# Patient Record
(demographics unavailable — no encounter records)

---

## 2024-12-19 NOTE — HEALTH RISK ASSESSMENT
[Very Good] : ~his/her~  mood as very good [No] : No [Monthly or less (1 pt)] : Monthly or less (1 point) [1 or 2 (0 pts)] : 1 or 2 (0 points) [Never (0 pts)] : Never (0 points) [No falls in past year] : Patient reported no falls in the past year [0] : 2) Feeling down, depressed, or hopeless: Not at all (0) [de-identified] : i do [de-identified] : ok [Never] : Never [NO] : No [Patient reported mammogram was normal] : Patient reported mammogram was normal [Patient declined PAP Smear] : Patient declined PAP Smear [Patient reported bone density results were normal] : Patient reported bone density results were normal [Patient reported colonoscopy was normal] : Patient reported colonoscopy was normal [HIV test declined] : HIV test declined [Hepatitis C test declined] : Hepatitis C test declined [Change in mental status noted] : No change in mental status noted [Language] : denies difficulty with language [Behavior] : denies difficulty with behavior [Learning/Retaining New Information] : denies difficulty learning/retaining new information [Handling Complex Tasks] : denies difficulty handling complex tasks [Reasoning] : denies difficulty with reasoning [Spatial Ability and Orientation] : denies difficulty with spatial ability and orientation [None] : None [With Significant Other] : lives with significant other [Retired] : retired [] :  [# Of Children ___] : has [unfilled] children [Sexually Active] : not sexually active [Feels Safe at Home] : Feels safe at home [Fully functional (bathing, dressing, toileting, transferring, walking, feeding)] : Fully functional (bathing, dressing, toileting, transferring, walking, feeding) [Fully functional (using the telephone, shopping, preparing meals, housekeeping, doing laundry, using] : Fully functional and needs no help or supervision to perform IADLs (using the telephone, shopping, preparing meals, housekeeping, doing laundry, using transportation, managing medications and managing finances) [Reports changes in hearing] : Reports no changes in hearing [Reports changes in vision] : Reports no changes in vision [Reports changes in dental health] : Reports no changes in dental health [MammogramDate] : 2024 [BoneDensityDate] : 1/2024 [ColonoscopyDate] : 2018

## 2025-05-15 NOTE — HISTORY OF PRESENT ILLNESS
[FreeTextEntry1] : epigastric pain [de-identified] : pressure pain epigastric  radiates to back worse at right

## 2025-06-05 NOTE — HISTORY OF PRESENT ILLNESS
[FreeTextEntry1] : pancreatic lesion [de-identified] : pt w/ recurrent epigastric pain and US and MRCP as below needs EUS

## 2025-06-18 NOTE — HISTORY OF PRESENT ILLNESS
[Disease: _____________________] : Disease: [unfilled] [de-identified] : 73 years old female with PMHX of High Cholesterol, GERD, Seasonal Allergies, Asthma presents for initial evaluation of newly diagnosed Pancreatic Cancer. As per patient, her symptoms began 1 month ago with mid upper abdominal pain. The pain was on and off 10/10 which radiates towards her back. She had increased fatigue, decreased appetite and weight loss of ~18 pounds. She was seen by her PCP who ordered an abdominal ultrasound that revealed pancreatic mass. A MRCP was ordered with a biopsy that was positive for adenocarcinoma. She denies any itchy skin, yellowing eyes/skin, tea-colored urine, or pale stools.  The patient is  and lives with her . She has 3 well adult children. The patent is former smoker who stopped smoking 40 years ago. Prior, she smoked 1/2 PPD X 12 years. She drinks 1 to 2 glasses of wine daily x 20 years. The patient is a retired  for [employer]. She has a positive family history of cancer. Her mother had Breast Cancer at 43 years old.   05/22/2025, US Abdomen: No evidence of cholelithiasis. Apparent pancreatic mass. Further evaluation with CT or MRI/MRCP is recommended.  05/27/2025, MRI, MRCP: 4.1 x 2.3 x 0.8 cm lesion in the body of the pancreas which correlates with the lesion seen on the ultrasound of the abdomen on 5/19/2025. Differentials include pancreatic carcinoma such as adenocarcinoma or acinar cell carcinoma. Recommend EUS/biopsy for further evaluation.  06/09/2025, Surgical Pathology Report: 1.  Gastric, random biopsy-   Mild Chronic inactive gastritis and mild reactive gastropathy change. Negative for intestinal metaplasia. Negative for morphologic evidence of H. pylori. 2.  Pancreatic body, mass, biopsy - Adenocarcinoma.  06/12/2025, CT-Scan of Abdomen/Pelvis/Chest: Pancreatic body hypo enhancing mass with distal duct dilatation and atrophy, suspicious for adenocarcinoma. No evidence of vascular involvement or metastatic disease.  [de-identified] :  Adenocarcinoma

## 2025-06-18 NOTE — ASSESSMENT
[Curative] : Goals of care discussed with patient: Curative [FreeTextEntry1] : A discussion took place with the patient and her  regarding further management.  In summary the patient presented  1 month ago with initial abdominal and low back pain associated with nausea and 18 pound weight loss along with metallic taste.  The pain intensity was 10/10.  The patient underwent ultrasound and MRCP in May 2025 and was found to have a 2.8 cm mass in the body of the pancreas.  Endoscopic ultrasound confirmed a 2.7 cm mass in the body of the pancreas and biopsy was positive for adenocarcinoma.  The tumor markers are in the normal range.  Of note is a family history of breast cancer in her mother at age 42.    The review of her imaging suggests that the tumor is close to a vein and therefore the patient would be eligible for neoadjuvant treatment.  Since she has a good performance status we discussed initial treatment with FOLFIRINOX including oxaliplatin, 5-FU, leucovorin and irinotecan.  The side effects and toxicity were reviewed in detail and informed consent was obtained.  The patient is also being evaluated for protocol therapy with a taxane impregnated mesh placed on the pancreas followed by chemotherapy and that attempt to increase delivery of chemotherapy to the tumor.  The patient will be evaluated for Mediport placement, screening for hepatitis and also for genes that may increase toxicity of the chemotherapy.  We also spoke about nutrition consultation to maintain adequate caloric intake and hopefully avoid further weight loss.  He also would be a candidate for psychosocial consultation for psychologic support during her treatments.  We also mention salvage treatment with gemcitabine and Abraxane which could be used should the FOLFIRINOX regimen not be effective in controlling her disease.  Once we have more information further recommendations will be made.

## 2025-06-18 NOTE — HISTORY OF PRESENT ILLNESS
[Disease: _____________________] : Disease: [unfilled] [de-identified] : 73 years old female with PMHX of High Cholesterol, GERD, Seasonal Allergies, Asthma presents for initial evaluation of newly diagnosed Pancreatic Cancer. As per patient, her symptoms began 1 month ago with mid upper abdominal pain. The pain was on and off 10/10 which radiates towards her back. She had increased fatigue, decreased appetite and weight loss of ~18 pounds. She was seen by her PCP who ordered an abdominal ultrasound that revealed pancreatic mass. A MRCP was ordered with a biopsy that was positive for adenocarcinoma. She denies any itchy skin, yellowing eyes/skin, tea-colored urine, or pale stools.  The patient is  and lives with her . She has 3 well adult children. The patent is former smoker who stopped smoking 40 years ago. Prior, she smoked 1/2 PPD X 12 years. She drinks 1 to 2 glasses of wine daily x 20 years. The patient is a retired  for [employer]. She has a positive family history of cancer. Her mother had Breast Cancer at 43 years old.   05/22/2025, US Abdomen: No evidence of cholelithiasis. Apparent pancreatic mass. Further evaluation with CT or MRI/MRCP is recommended.  05/27/2025, MRI, MRCP: 4.1 x 2.3 x 0.8 cm lesion in the body of the pancreas which correlates with the lesion seen on the ultrasound of the abdomen on 5/19/2025. Differentials include pancreatic carcinoma such as adenocarcinoma or acinar cell carcinoma. Recommend EUS/biopsy for further evaluation.  06/09/2025, Surgical Pathology Report: 1.  Gastric, random biopsy-   Mild Chronic inactive gastritis and mild reactive gastropathy change. Negative for intestinal metaplasia. Negative for morphologic evidence of H. pylori. 2.  Pancreatic body, mass, biopsy - Adenocarcinoma.  06/12/2025, CT-Scan of Abdomen/Pelvis/Chest: Pancreatic body hypo enhancing mass with distal duct dilatation and atrophy, suspicious for adenocarcinoma. No evidence of vascular involvement or metastatic disease.  [de-identified] :  Adenocarcinoma

## 2025-06-20 NOTE — ASSESSMENT
[FreeTextEntry1] : 74yo F with 3.1 cm pancreatic body mass.  Abuts confluence portal vein/ splenic.  Borderline resectable.  Discussed preference for upfront systemic therapy.   First identified on an abd u/s 5/2025 which was obtained for GI complaints. MRCP 5/27/2025 showed a 4.1 x 2.3 x 0.8 cm pancreatic body lesion. EUS 6/9/2025 w Dr. Ambrosio confirmed a 2.7 x 2.6cm pancreatic body mass which was biopsied. PD measured 2 mm. No significant abnormality seen in HOP, genu of pancreas, panc tail or main PD, gallbladder, CBD, or left hepatic lobe.  Pathology w adenocarcinoma.  CA 19-9 non-secretor.   CT CAP Panc Protocol 6/12/2025 Pancreatic body hypoenhancing mass with distal duct dilatation and atrophy, suspicious for adenocarcinoma.   PANTHER trial eligibility: Patient's tumor size and anatomy is amenable to PTM-101 placement based on imaging Patient has no signs or symptoms of pancreatitis Patient does not have other active medical issues which would confound interpretation of safety monitoring, efficacy results or prevent the subject from study participation Patient is not of child-bearing potential Patient does not have active non-pancreatic cancer that currently requires treatment or is being treated : non-melanoma skin cancer in 2024 is not exclusionary Patient does not have any contraindications or allergies to paclitaxel, PLGA, or chemotherapies in protocol (e.g., FOLFIRINOX, gemcitabine, nab-paclitaxel) Patient does not have known HIV or active viral hepatitis Patient does not have active ongoing infection or autoimmune disease which may preclude laparoscopy, placement of the PTM-101 implant, administration of chemotherapy or surgical resection of pancreatic tumor Patient does not have the inability to comply with activities and therapeutic interventions as outlined in the schedule of events Patient is not currently enrolled in another investigational drug or device trial Patient does not have any other medical or surgical conditions, and prior abdominal surgery, that would preclude safe laparoscopy or implantation  Plan: Upfront systemic therapy Discuss w clinical trial team eligibility for PANTHER Trial  If plan for clinical trial will plan for mediport placement at same time Genetics, dietician, social work evals

## 2025-06-20 NOTE — HISTORY OF PRESENT ILLNESS
[de-identified] : 74yo F who underwent abdominal ultrasound for c/o abd pain, nausea and 18lb unintentional weight loss x 2-3 months.   Hemoglobin was slightly elevated 6.2.   Ultrasound on 5/19/2025 showed no cholelithiasis but did note a 2.8 cm pancreatic neck/body mass.   MRCP 5/27/2025 showed a 4.1 x 2.3 x 0.8 cm pancreatic body lesion.    EUS 6/9/2025 w Dr. Ambrosio confirmed a 2.7 x 2.6 cm pancreatic body mass which was biopsied. PD measured 2 mm. No significant abnormality seen in HOP, genu of pancreas, panc tail or main PD, gallbladder, CBD, or left hepatic lobe.   Pathology with adenocarcinoma.   Ca19-9 < 2 & CEA 2.9 on 6/10/2025.   CT CAP Panc Protocol 6/12/2025 Pancreatic body hypoenhancing mass with distal duct dilatation and atrophy, suspicious for adenocarcinoma. No evidence of vascular involvement or metastatic disease.  Patient was presented at [facility] and consensus was for neoadj therapy.   6/17/2025 136lb ---> 118lb Continues with abdominal pain. Impacting sleep. Taking Excedrin and Tylenol q 4-6 hours. Helps momentarily then returns.  + nausea but no vomiting.  + fatigue. Mild anorexia & early satiety.    PMHx: HDL, Asthma, GERD  PSHx: None  FHx: Mother w breast ca (age 43yo), no genetic testing.   Allergies: Amoxicillin   Meds: Omeprazole AM, Pantoprazole HS, Montelukast, Crestor   Social: Lives w . Non-smoker, enjoys alcohol, but has stopped since diagnosis. 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

## 2025-06-20 NOTE — HISTORY OF PRESENT ILLNESS
[de-identified] : 74yo F who underwent abdominal ultrasound for c/o abd pain, nausea and 18lb unintentional weight loss x 2-3 months.   Hemoglobin was slightly elevated 6.2.   Ultrasound on 5/19/2025 showed no cholelithiasis but did note a 2.8 cm pancreatic neck/body mass.   MRCP 5/27/2025 showed a 4.1 x 2.3 x 0.8 cm pancreatic body lesion.    EUS 6/9/2025 w Dr. Ambrosio confirmed a 2.7 x 2.6 cm pancreatic body mass which was biopsied. PD measured 2 mm. No significant abnormality seen in HOP, genu of pancreas, panc tail or main PD, gallbladder, CBD, or left hepatic lobe.   Pathology with adenocarcinoma.   Ca19-9 < 2 & CEA 2.9 on 6/10/2025.   CT CAP Panc Protocol 6/12/2025 Pancreatic body hypoenhancing mass with distal duct dilatation and atrophy, suspicious for adenocarcinoma. No evidence of vascular involvement or metastatic disease.  Patient was presented at [facility] and consensus was for neoadj therapy.   6/17/2025 136lb ---> 118lb Continues with abdominal pain. Impacting sleep. Taking Excedrin and Tylenol q 4-6 hours. Helps momentarily then returns.  + nausea but no vomiting.  + fatigue. Mild anorexia & early satiety.    PMHx: HDL, Asthma, GERD  PSHx: None  FHx: Mother w breast ca (age 41yo), no genetic testing.   Allergies: Amoxicillin   Meds: Omeprazole AM, Pantoprazole HS, Montelukast, Crestor   Social: Lives w . Non-smoker, enjoys alcohol, but has stopped since diagnosis. 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

## 2025-06-20 NOTE — PHYSICAL EXAM
[FreeTextEntry1] : General: No acute distress. Well nourished. Head: AT/NC Eyes: PERRL. EOMI. Pulmonary: No respiratory distress.  ABD: Soft. NT/ND. No rebound, no guarding, no rigidity. No peritoneal signs. No masses. Extremities: Warm. No edema. Neurological: A&O x 4. Normal strength. Psychiatric: Normal affect and mood.

## 2025-06-20 NOTE — HISTORY OF PRESENT ILLNESS
[de-identified] : 72yo F who underwent abdominal ultrasound for c/o abd pain, nausea and 18lb unintentional weight loss x 2-3 months.   Hemoglobin was slightly elevated 6.2.   Ultrasound on 5/19/2025 showed no cholelithiasis but did note a 2.8 cm pancreatic neck/body mass.   MRCP 5/27/2025 showed a 4.1 x 2.3 x 0.8 cm pancreatic body lesion.    EUS 6/9/2025 w Dr. Ambrosio confirmed a 2.7 x 2.6 cm pancreatic body mass which was biopsied. PD measured 2 mm. No significant abnormality seen in HOP, genu of pancreas, panc tail or main PD, gallbladder, CBD, or left hepatic lobe.   Pathology with adenocarcinoma.   Ca19-9 < 2 & CEA 2.9 on 6/10/2025.   CT CAP Panc Protocol 6/12/2025 Pancreatic body hypoenhancing mass with distal duct dilatation and atrophy, suspicious for adenocarcinoma. No evidence of vascular involvement or metastatic disease.  Patient was presented at [facility] and consensus was for neoadj therapy.   6/17/2025 136lb ---> 118lb Continues with abdominal pain. Impacting sleep. Taking Excedrin and Tylenol q 4-6 hours. Helps momentarily then returns.  + nausea but no vomiting.  + fatigue. Mild anorexia & early satiety.    PMHx: HDL, Asthma, GERD  PSHx: None  FHx: Mother w breast ca (age 41yo), no genetic testing.   Allergies: Amoxicillin   Meds: Omeprazole AM, Pantoprazole HS, Montelukast, Crestor   Social: Lives w . Non-smoker, enjoys alcohol, but has stopped since diagnosis. 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

## 2025-06-20 NOTE — ASSESSMENT
[FreeTextEntry1] : 72yo F with 3.1 cm pancreatic body mass.  Abuts confluence portal vein/ splenic.  Borderline resectable.  Discussed preference for upfront systemic therapy.   First identified on an abd u/s 5/2025 which was obtained for GI complaints. MRCP 5/27/2025 showed a 4.1 x 2.3 x 0.8 cm pancreatic body lesion. EUS 6/9/2025 w Dr. Ambrosio confirmed a 2.7 x 2.6cm pancreatic body mass which was biopsied. PD measured 2 mm. No significant abnormality seen in HOP, genu of pancreas, panc tail or main PD, gallbladder, CBD, or left hepatic lobe.  Pathology w adenocarcinoma.  CA 19-9 non-secretor.   CT CAP Panc Protocol 6/12/2025 Pancreatic body hypoenhancing mass with distal duct dilatation and atrophy, suspicious for adenocarcinoma.   PANTHER trial eligibility: Patient's tumor size and anatomy is amenable to PTM-101 placement based on imaging Patient has no signs or symptoms of pancreatitis Patient does not have other active medical issues which would confound interpretation of safety monitoring, efficacy results or prevent the subject from study participation Patient is not of child-bearing potential Patient does not have active non-pancreatic cancer that currently requires treatment or is being treated : non-melanoma skin cancer in 2024 is not exclusionary Patient does not have any contraindications or allergies to paclitaxel, PLGA, or chemotherapies in protocol (e.g., FOLFIRINOX, gemcitabine, nab-paclitaxel) Patient does not have known HIV or active viral hepatitis Patient does not have active ongoing infection or autoimmune disease which may preclude laparoscopy, placement of the PTM-101 implant, administration of chemotherapy or surgical resection of pancreatic tumor Patient does not have the inability to comply with activities and therapeutic interventions as outlined in the schedule of events Patient is not currently enrolled in another investigational drug or device trial Patient does not have any other medical or surgical conditions, and prior abdominal surgery, that would preclude safe laparoscopy or implantation  Plan: Upfront systemic therapy Discuss w clinical trial team eligibility for PANTHER Trial  If plan for clinical trial will plan for mediport placement at same time Genetics, dietician, social work evals

## 2025-06-24 NOTE — HISTORY OF PRESENT ILLNESS
[No Pertinent Cardiac History] : no history of aortic stenosis, atrial fibrillation, coronary artery disease, recent myocardial infarction, or implantable device/pacemaker [No Pertinent Pulmonary History] : no history of asthma, COPD, sleep apnea, or smoking [No Adverse Anesthesia Reaction] : no adverse anesthesia reaction in self or family member [(Patient denies any chest pain, claudication, dyspnea on exertion, orthopnea, palpitations or syncope)] : Patient denies any chest pain, claudication, dyspnea on exertion, orthopnea, palpitations or syncope [Good (7-10 METs)] : Good (7-10 METs) [Aortic Stenosis] : no aortic stenosis [Atrial Fibrillation] : no atrial fibrillation [Coronary Artery Disease] : no coronary artery disease [Recent Myocardial Infarction] : no recent myocardial infarction [Implantable Device/Pacemaker] : no implantable device/pacemaker [Asthma] : no asthma [COPD] : no COPD [Sleep Apnea] : no sleep apnea [Smoker] : not a smoker [Family Member] : no family member with adverse anesthesia reaction/sudden death [Self] : no previous adverse anesthesia reaction [Chronic Anticoagulation] : no chronic anticoagulation [Chronic Kidney Disease] : no chronic kidney disease [Diabetes] : no diabetes [FreeTextEntry1] : Laparoscopic pancreatic surgery / SOO trial [FreeTextEntry2] : 6/26/2025 [FreeTextEntry3] : Dr Parish

## 2025-06-24 NOTE — ASSESSMENT
[High Risk Surgery - Intraperitoneal, Intrathoracic or Supringuinal Vascular Procedures] : High Risk Surgery - Intraperitoneal, Intrathoracic or Supringuinal Vascular Procedures - No (0) [Ischemic Heart Disease] : Ischemic Heart Disease - No (0) [Congestive Heart Failure] : Congestive Heart Failure - No (0) [Prior Cerebrovascular Accident or TIA] : Prior Cerebrovascular Accident or TIA - No (0) [Creatinine >= 2mg/dL (1 Point)] : Creatinine >= 2mg/dL - No (0) [Insulin-dependent Diabetic (1 Point)] : Insulin-dependent Diabetic - No (0) [0] : 0 , RCRI Class: I, Risk of Post-Op Cardiac Complications: 3.9%, 95% CI for Risk Estimate: 2.8% - 5.4% [Patient Optimized for Surgery] : Patient optimized for surgery [No Further Testing Recommended] : no further testing recommended [Modify anticoagulant treatment prior to procedure] : Modify anticoagulant treatment prior to procedure [Modify anti-platelet treatment prior to procedure] : Modify anti-platelet treatment prior to procedure [Continue medications as is] : Continue current medications

## 2025-07-02 NOTE — ASSESSMENT
[FreeTextEntry1] : 72yo F with 3.1 cm pancreatic body mass.  Abuts confluence portal vein/ splenic.  Borderline resectable.  Discussed preference for upfront systemic therapy.   First identified on an abd u/s 5/2025 which was obtained for GI complaints. MRCP 5/27/2025 showed a 4.1 x 2.3 x 0.8 cm pancreatic body lesion. EUS 6/9/2025 w Dr. Ambrosio confirmed a 2.7 x 2.6cm pancreatic body mass which was biopsied. PD measured 2 mm. No significant abnormality seen in HOP, genu of pancreas, panc tail or main PD, gallbladder, CBD, or left hepatic lobe.  Pathology w adenocarcinoma.  CA 19-9 non-secretor.   CT CAP Panc Protocol 6/12/2025 Pancreatic body hypoenhancing mass with distal duct dilatation and atrophy, suspicious for adenocarcinoma.   PANTHER trial eligibility: Patient's tumor size and anatomy is amenable to PTM-101 placement based on imaging Patient has no signs or symptoms of pancreatitis Patient does not have other active medical issues which would confound interpretation of safety monitoring, efficacy results or prevent the subject from study participation Patient is not of child-bearing potential Patient does not have active non-pancreatic cancer that currently requires treatment or is being treated : non-melanoma skin cancer in 2024 is not exclusionary Patient does not have any contraindications or allergies to paclitaxel, PLGA, or chemotherapies in protocol (e.g., FOLFIRINOX, gemcitabine, nab-paclitaxel) Patient does not have known HIV or active viral hepatitis Patient does not have active ongoing infection or autoimmune disease which may preclude laparoscopy, placement of the PTM-101 implant, administration of chemotherapy or surgical resection of pancreatic tumor Patient does not have the inability to comply with activities and therapeutic interventions as outlined in the schedule of events Patient is not currently enrolled in another investigational drug or device trial Patient does not have any other medical or surgical conditions, and prior abdominal surgery, that would preclude safe laparoscopy or implantation  Patient enrolled in PANTHER, s/p pancreatic patch placement, diagnostic laparoscopy and Mediport placement on 6/26.  Plan: Labs today, follow up next week Plan for Neoadj FOLFIRINOX 3 weeks after patch placement, to start 7/17 Genetics, dietician, social work evals

## 2025-07-02 NOTE — HISTORY OF PRESENT ILLNESS
[de-identified] : 74yo F who underwent abdominal ultrasound for c/o abd pain, nausea and 18lb unintentional weight loss x 2-3 months.   Hemoglobin was slightly elevated 6.2.   Ultrasound on 5/19/2025 showed no cholelithiasis but did note a 2.8 cm pancreatic neck/body mass.   MRCP 5/27/2025 showed a 4.1 x 2.3 x 0.8 cm pancreatic body lesion.    EUS 6/9/2025 w Dr. Ambrosio confirmed a 2.7 x 2.6 cm pancreatic body mass which was biopsied. PD measured 2 mm. No significant abnormality seen in HOP, genu of pancreas, panc tail or main PD, gallbladder, CBD, or left hepatic lobe.   Pathology with adenocarcinoma.   Ca19-9 < 2 & CEA 2.9 on 6/10/2025.   CT CAP Panc Protocol 6/12/2025 Pancreatic body hypoenhancing mass with distal duct dilatation and atrophy, suspicious for adenocarcinoma. No evidence of vascular involvement or metastatic disease.  Patient was presented at [facility] and consensus was for neoadj therapy.   6/17/2025 136lb ---> 118lb Continues with abdominal pain. Impacting sleep. Taking Excedrin and Tylenol q 4-6 hours. Helps momentarily then returns.  + nausea but no vomiting.  + fatigue. Mild anorexia & early satiety.   Interval History 7/2/2025: Pt s/p diagnostic lap with pancreatic patch placement on 6/26. Patient reports feeling bloating, intermittent abd pain. Stool fluctuates between pale and brown. Some straining but not hard. Hydating well, > 1 L H20 daily, some coffee, tea.  + Anorexia. Eating 2 small meals a day plus some snacks.   7/2/2025 ECOG 0   PMHx: HDL, Asthma, GERD  PSHx: None  FHx: Mother w breast ca (age 43yo), no genetic testing.   Allergies: Amoxicillin   Meds: Omeprazole AM, Pantoprazole HS, Montelukast, Crestor   Social: Lives w . Non-smoker, enjoys alcohol, but has stopped since diagnosis. 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

## 2025-07-02 NOTE — PHYSICAL EXAM
[FreeTextEntry1] : General: No acute distress. Well nourished. Head: AT/NC Eyes: PERRL. EOMI. Pulmonary: No respiratory distress.  ABD: Soft. NT/ND. No rebound, no guarding, no rigidity. No peritoneal signs. No masses. Well healing lap sites.  Extremities: Warm. No edema. Neurological: A&O x 4. Normal strength. Psychiatric: Normal affect and mood.

## 2025-07-08 NOTE — ASSESSMENT
[FreeTextEntry1] : 72yo F with 3.1 cm pancreatic body mass.  Abuts confluence portal vein/ splenic.  Borderline resectable.  Discussed preference for upfront systemic therapy.   First identified on an abd u/s 5/2025 which was obtained for GI complaints. MRCP 5/27/2025 showed a 4.1 x 2.3 x 0.8 cm pancreatic body lesion. EUS 6/9/2025 w Dr. Ambrosio confirmed a 2.7 x 2.6cm pancreatic body mass which was biopsied. PD measured 2 mm. No significant abnormality seen in HOP, genu of pancreas, panc tail or main PD, gallbladder, CBD, or left hepatic lobe.  Pathology w adenocarcinoma.  CA 19-9 non-secretor.   CT CAP Panc Protocol 6/12/2025 Pancreatic body hypoenhancing mass with distal duct dilatation and atrophy, suspicious for adenocarcinoma.   PANTHER trial eligibility: Patient's tumor size and anatomy is amenable to PTM-101 placement based on imaging Patient has no signs or symptoms of pancreatitis Patient does not have other active medical issues which would confound interpretation of safety monitoring, efficacy results or prevent the subject from study participation Patient is not of child-bearing potential Patient does not have active non-pancreatic cancer that currently requires treatment or is being treated : non-melanoma skin cancer in 2024 is not exclusionary Patient does not have any contraindications or allergies to paclitaxel, PLGA, or chemotherapies in protocol (e.g., FOLFIRINOX, gemcitabine, nab-paclitaxel) Patient does not have known HIV or active viral hepatitis Patient does not have active ongoing infection or autoimmune disease which may preclude laparoscopy, placement of the PTM-101 implant, administration of chemotherapy or surgical resection of pancreatic tumor Patient does not have the inability to comply with activities and therapeutic interventions as outlined in the schedule of events Patient is not currently enrolled in another investigational drug or device trial Patient does not have any other medical or surgical conditions, and prior abdominal surgery, that would preclude safe laparoscopy or implantation  Patient enrolled in PANTHER, s/p pancreatic patch placement, diagnostic laparoscopy and Mediport placement on 6/26.  Plan: Plan for Neoadj FOLFIRINOX 3 weeks after patch placement, to start 7/17 Pretreatment CT Follow up labs Genetics, dietician, social work evals

## 2025-07-08 NOTE — HISTORY OF PRESENT ILLNESS
[de-identified] : 74yo F who underwent abdominal ultrasound for c/o abd pain, nausea and 18lb unintentional weight loss x 2-3 months.   Hemoglobin was slightly elevated 6.2.   Ultrasound on 5/19/2025 showed no cholelithiasis but did note a 2.8 cm pancreatic neck/body mass.   MRCP 5/27/2025 showed a 4.1 x 2.3 x 0.8 cm pancreatic body lesion.    EUS 6/9/2025 w Dr. Ambrosio confirmed a 2.7 x 2.6 cm pancreatic body mass which was biopsied. PD measured 2 mm. No significant abnormality seen in HOP, genu of pancreas, panc tail or main PD, gallbladder, CBD, or left hepatic lobe.   Pathology with adenocarcinoma.   Ca19-9 < 2 & CEA 2.9 on 6/10/2025.   CT CAP Panc Protocol 6/12/2025 Pancreatic body hypoenhancing mass with distal duct dilatation and atrophy, suspicious for adenocarcinoma. No evidence of vascular involvement or metastatic disease.  Patient was presented at [facility] and consensus was for neoadj therapy.   6/17/2025 136lb ---> 118lb Continues with abdominal pain. Impacting sleep. Taking Excedrin and Tylenol q 4-6 hours. Helps momentarily then returns.  + nausea but no vomiting.  + fatigue. Mild anorexia & early satiety.   Interval History 7/2/2025: Pt s/p diagnostic lap with pancreatic patch placement on 6/26. Patient reports feeling bloating, intermittent abd pain. Stool fluctuates between pale and brown. Some straining but not hard. Hydating well, > 1 L H20 daily, some coffee, tea.  + Anorexia. Eating 2 small meals a day plus some snacks.   7/2/2025 ECOG 0   PMHx: HDL, Asthma, GERD  PSHx: None  FHx: Mother w breast ca (age 41yo), no genetic testing.   Allergies: Amoxicillin   Meds: Omeprazole AM, Pantoprazole HS, Montelukast, Crestor   Social: Lives w . Non-smoker, enjoys alcohol, but has stopped since diagnosis. 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

## 2025-07-08 NOTE — ASSESSMENT
[FreeTextEntry1] : 74yo F with 3.1 cm pancreatic body mass.  Abuts confluence portal vein/ splenic.  Borderline resectable.  Discussed preference for upfront systemic therapy.   First identified on an abd u/s 5/2025 which was obtained for GI complaints. MRCP 5/27/2025 showed a 4.1 x 2.3 x 0.8 cm pancreatic body lesion. EUS 6/9/2025 w Dr. Ambrosio confirmed a 2.7 x 2.6cm pancreatic body mass which was biopsied. PD measured 2 mm. No significant abnormality seen in HOP, genu of pancreas, panc tail or main PD, gallbladder, CBD, or left hepatic lobe.  Pathology w adenocarcinoma.  CA 19-9 non-secretor.   CT CAP Panc Protocol 6/12/2025 Pancreatic body hypoenhancing mass with distal duct dilatation and atrophy, suspicious for adenocarcinoma.   PANTHER trial eligibility: Patient's tumor size and anatomy is amenable to PTM-101 placement based on imaging Patient has no signs or symptoms of pancreatitis Patient does not have other active medical issues which would confound interpretation of safety monitoring, efficacy results or prevent the subject from study participation Patient is not of child-bearing potential Patient does not have active non-pancreatic cancer that currently requires treatment or is being treated : non-melanoma skin cancer in 2024 is not exclusionary Patient does not have any contraindications or allergies to paclitaxel, PLGA, or chemotherapies in protocol (e.g., FOLFIRINOX, gemcitabine, nab-paclitaxel) Patient does not have known HIV or active viral hepatitis Patient does not have active ongoing infection or autoimmune disease which may preclude laparoscopy, placement of the PTM-101 implant, administration of chemotherapy or surgical resection of pancreatic tumor Patient does not have the inability to comply with activities and therapeutic interventions as outlined in the schedule of events Patient is not currently enrolled in another investigational drug or device trial Patient does not have any other medical or surgical conditions, and prior abdominal surgery, that would preclude safe laparoscopy or implantation  Patient enrolled in PANTHER, s/p pancreatic patch placement, diagnostic laparoscopy and Mediport placement on 6/26.  Plan: Plan for Neoadj FOLFIRINOX 3 weeks after patch placement, to start 7/17 Pretreatment CT Follow up labs Genetics, dietician, social work evals

## 2025-07-08 NOTE — HISTORY OF PRESENT ILLNESS
[de-identified] : 72yo F who underwent abdominal ultrasound for c/o abd pain, nausea and 18lb unintentional weight loss x 2-3 months.   Hemoglobin was slightly elevated 6.2.   Ultrasound on 5/19/2025 showed no cholelithiasis but did note a 2.8 cm pancreatic neck/body mass.   MRCP 5/27/2025 showed a 4.1 x 2.3 x 0.8 cm pancreatic body lesion.    EUS 6/9/2025 w Dr. Ambrosio confirmed a 2.7 x 2.6 cm pancreatic body mass which was biopsied. PD measured 2 mm. No significant abnormality seen in HOP, genu of pancreas, panc tail or main PD, gallbladder, CBD, or left hepatic lobe.   Pathology with adenocarcinoma.   Ca19-9 < 2 & CEA 2.9 on 6/10/2025.   CT CAP Panc Protocol 6/12/2025 Pancreatic body hypoenhancing mass with distal duct dilatation and atrophy, suspicious for adenocarcinoma. No evidence of vascular involvement or metastatic disease.  Patient was presented at [facility] and consensus was for neoadj therapy.   6/17/2025 136lb ---> 118lb Continues with abdominal pain. Impacting sleep. Taking Excedrin and Tylenol q 4-6 hours. Helps momentarily then returns.  + nausea but no vomiting.  + fatigue. Mild anorexia & early satiety.   Interval History 7/2/2025: Pt s/p diagnostic lap with pancreatic patch placement on 6/26. Patient reports feeling bloating, intermittent abd pain. Stool fluctuates between pale and brown. Some straining but not hard. Hydating well, > 1 L H20 daily, some coffee, tea.  + Anorexia. Eating 2 small meals a day plus some snacks.   7/2/2025 ECOG 0   PMHx: HDL, Asthma, GERD  PSHx: None  FHx: Mother w breast ca (age 43yo), no genetic testing.   Allergies: Amoxicillin   Meds: Omeprazole AM, Pantoprazole HS, Montelukast, Crestor   Social: Lives w . Non-smoker, enjoys alcohol, but has stopped since diagnosis. 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

## 2025-07-09 NOTE — HISTORY OF PRESENT ILLNESS
[Disease: _____________________] : Disease: [unfilled] [de-identified] : 73 years old female with PMHX of High Cholesterol, GERD, Seasonal Allergies, Asthma presents for initial evaluation of newly diagnosed Pancreatic Cancer. As per patient, her symptoms began 1 month ago with mid upper abdominal pain. The pain was on and off 10/10 which radiates towards her back. She had increased fatigue, decreased appetite and weight loss of ~18 pounds. She was seen by her PCP who ordered an abdominal ultrasound that revealed pancreatic mass. A MRCP was ordered with a biopsy that was positive for adenocarcinoma. She denies any itchy skin, yellowing eyes/skin, tea-colored urine, or pale stools.  The patient is  and lives with her . She has 3 well adult children. The patent is former smoker who stopped smoking 40 years ago. Prior, she smoked 1/2 PPD X 12 years. She drinks 1 to 2 glasses of wine daily x 20 years. The patient is a retired  for [employer]. She has a positive family history of cancer. Her mother had Breast Cancer at 43 years old.   05/22/2025, US Abdomen: No evidence of cholelithiasis. Apparent pancreatic mass. Further evaluation with CT or MRI/MRCP is recommended.  05/27/2025, MRI, MRCP: 4.1 x 2.3 x 0.8 cm lesion in the body of the pancreas which correlates with the lesion seen on the ultrasound of the abdomen on 5/19/2025. Differentials include pancreatic carcinoma such as adenocarcinoma or acinar cell carcinoma. Recommend EUS/biopsy for further evaluation.  06/09/2025, Surgical Pathology Report: 1.  Gastric, random biopsy-   Mild Chronic inactive gastritis and mild reactive gastropathy change. Negative for intestinal metaplasia. Negative for morphologic evidence of H. pylori. 2.  Pancreatic body, mass, biopsy - Adenocarcinoma.  06/12/2025, CT-Scan of Abdomen/Pelvis/Chest: Pancreatic body hypo enhancing mass with distal duct dilatation and atrophy, suspicious for adenocarcinoma. No evidence of vascular involvement or metastatic disease.  [de-identified] :  Adenocarcinoma [de-identified] : The patient is here for follow up.  She is /p diagnostic lap with pancreatic patch placement on 6/26/2025. She feels fine except for 1 episode of lower abdominal pain with nausea and cramping. She denies any vomiting, diarrhea, or constipation. Her stools sometimes float in toilet, but no longer greasy.

## 2025-07-09 NOTE — ASSESSMENT
[FreeTextEntry1] : The patient is s/p diagnostic lap with pancreatic patch placement on 6/26/2025. She had one episode of abdominal pain with cramping. A RX for Creon [id number] units:2- 3 capsule with meals/1 capusle with snack was sent to [facility] to start. She is stable to start neoadjuvant C1 FOLFIRINOX, next week.  We will continue to monitor the patient for side effects and toxicities. RTO in 1 week.  Patient being seen as per physician's primary plan of care.

## 2025-07-09 NOTE — HISTORY OF PRESENT ILLNESS
[Disease: _____________________] : Disease: [unfilled] [de-identified] : 73 years old female with PMHX of High Cholesterol, GERD, Seasonal Allergies, Asthma presents for initial evaluation of newly diagnosed Pancreatic Cancer. As per patient, her symptoms began 1 month ago with mid upper abdominal pain. The pain was on and off 10/10 which radiates towards her back. She had increased fatigue, decreased appetite and weight loss of ~18 pounds. She was seen by her PCP who ordered an abdominal ultrasound that revealed pancreatic mass. A MRCP was ordered with a biopsy that was positive for adenocarcinoma. She denies any itchy skin, yellowing eyes/skin, tea-colored urine, or pale stools.  The patient is  and lives with her . She has 3 well adult children. The patent is former smoker who stopped smoking 40 years ago. Prior, she smoked 1/2 PPD X 12 years. She drinks 1 to 2 glasses of wine daily x 20 years. The patient is a retired  for [employer]. She has a positive family history of cancer. Her mother had Breast Cancer at 43 years old.   05/22/2025, US Abdomen: No evidence of cholelithiasis. Apparent pancreatic mass. Further evaluation with CT or MRI/MRCP is recommended.  05/27/2025, MRI, MRCP: 4.1 x 2.3 x 0.8 cm lesion in the body of the pancreas which correlates with the lesion seen on the ultrasound of the abdomen on 5/19/2025. Differentials include pancreatic carcinoma such as adenocarcinoma or acinar cell carcinoma. Recommend EUS/biopsy for further evaluation.  06/09/2025, Surgical Pathology Report: 1.  Gastric, random biopsy-   Mild Chronic inactive gastritis and mild reactive gastropathy change. Negative for intestinal metaplasia. Negative for morphologic evidence of H. pylori. 2.  Pancreatic body, mass, biopsy - Adenocarcinoma.  06/12/2025, CT-Scan of Abdomen/Pelvis/Chest: Pancreatic body hypo enhancing mass with distal duct dilatation and atrophy, suspicious for adenocarcinoma. No evidence of vascular involvement or metastatic disease.  [de-identified] :  Adenocarcinoma [de-identified] : The patient is here for follow up.  She is /p diagnostic lap with pancreatic patch placement on 6/26/2025. She feels fine except for 1 episode of lower abdominal pain with nausea and cramping. She denies any vomiting, diarrhea, or constipation. Her stools sometimes float in toilet, but no longer greasy.

## 2025-07-09 NOTE — PHYSICAL EXAM
[Restricted in physically strenuous activity but ambulatory and able to carry out work of a light or sedentary nature] : Status 1- Restricted in physically strenuous activity but ambulatory and able to carry out work of a light or sedentary nature, e.g., light house work, office work [Normal] : affect appropriate [de-identified] : healing incisional wound with no erythema, no drainage.

## 2025-07-09 NOTE — ASSESSMENT
[FreeTextEntry1] : The patient is s/p diagnostic lap with pancreatic patch placement on 6/26/2025. She had one episode of abdominal pain with cramping. A RX for Creon [id number] units:2- 3 capsule with meals/1 capusle with snack was sent to [facility] to start. She is stable to start neoadjuvant C1 FOLFIRINOX, next week.  We will continue to monitor the patient for side effects and toxicities. RTO in 1 week.  Patient being seen as per physician's primary plan of care.

## 2025-07-09 NOTE — REASON FOR VISIT
[Follow-Up Visit] : a follow-up [Initial Consultation] : an initial consultation [Spouse] : spouse [FreeTextEntry2] : Pancreatic Cancer

## 2025-07-09 NOTE — PHYSICAL EXAM
[Restricted in physically strenuous activity but ambulatory and able to carry out work of a light or sedentary nature] : Status 1- Restricted in physically strenuous activity but ambulatory and able to carry out work of a light or sedentary nature, e.g., light house work, office work [Normal] : affect appropriate [de-identified] : healing incisional wound with no erythema, no drainage.

## 2025-07-16 NOTE — ASSESSMENT
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  Patient today again FOLFIRINOX chemotherapy.  She will be monitored for side effects toxicity.  The patient will receive treatment every 2 weeks for 4 cycles and then repeat scan to assess for response or progression of disease.  She does note some mild right-sided upper abdominal discomfort intermittently.  This responds to Tylenol.  She did try Creon treatment however it made her uncomfortable and she stopped the treatment.  She will return to the office in 2 weeks or sooner as needed. [Curative] : Goals of care discussed with patient: Curative

## 2025-07-16 NOTE — HISTORY OF PRESENT ILLNESS
[Disease: _____________________] : Disease: [unfilled] [de-identified] : 73 years old female 05/19/2025, US of Abdomen: No evidence of cholelithiasis. Apparent pancreatic mass. Further evaluation with CT or MRI/MRCP is recommended.  05/27/2025, MRI, MRCP: 4.1 x 2.3 x 0.8 cm lesion in the body of the pancreas which correlates with the lesion seen on the ultrasound of the abdomen on 5/19/2025. Differentials include pancreatic carcinoma such as adenocarcinoma or acinar cell carcinoma. Recommend EUS/biopsy for further evaluation.  06/09/2025, Surgical Pathology Report: 1.  Gastric, random biops-   Mild Chronic inactive gastritis and mild reactive gastropathy change. Negative for intestinal metaplasia. Negative for morphologic evidence of H. pylori. 2.  Pancreatic body, mass, biopsy -   Adenocarcinoma.  06/12/2025, CT-Scan of Abdomen/Pelvis/Chest: Pancreatic body hypoenhancing mass with distal duct dilatation and atrophy, suspicious for adenocarcinoma. No evidence of vascular involvement or metastatic disease.  [de-identified] : 6/9/2025- ethel [de-identified] : The patient returns to begin chemotherapy with FOLFIRINOX.

## 2025-07-22 NOTE — ASSESSMENT
[FreeTextEntry1] : 74yo F with 3.1 cm pancreatic body mass.  Abuts confluence portal vein/ splenic.  Borderline resectable.  Discussed preference for upfront systemic therapy.   First identified on an abd u/s 5/2025 which was obtained for GI complaints. MRCP 5/27/2025 showed a 4.1 x 2.3 x 0.8 cm pancreatic body lesion. EUS 6/9/2025 w Dr. Ambrosio confirmed a 2.7 x 2.6cm pancreatic body mass which was biopsied. PD measured 2 mm. No significant abnormality seen in HOP, genu of pancreas, panc tail or main PD, gallbladder, CBD, or left hepatic lobe.  Pathology w adenocarcinoma.  CA 19-9 non-secretor.   CT CAP Panc Protocol 6/12/2025 Pancreatic body hypoenhancing mass with distal duct dilatation and atrophy, suspicious for adenocarcinoma.   PANTHER trial eligibility: Patient's tumor size and anatomy is amenable to PTM-101 placement based on imaging Patient has no signs or symptoms of pancreatitis Patient does not have other active medical issues which would confound interpretation of safety monitoring, efficacy results or prevent the subject from study participation Patient is not of child-bearing potential Patient does not have active non-pancreatic cancer that currently requires treatment or is being treated : non-melanoma skin cancer in 2024 is not exclusionary Patient does not have any contraindications or allergies to paclitaxel, PLGA, or chemotherapies in protocol (e.g., FOLFIRINOX, gemcitabine, nab-paclitaxel) Patient does not have known HIV or active viral hepatitis Patient does not have active ongoing infection or autoimmune disease which may preclude laparoscopy, placement of the PTM-101 implant, administration of chemotherapy or surgical resection of pancreatic tumor Patient does not have the inability to comply with activities and therapeutic interventions as outlined in the schedule of events Patient is not currently enrolled in another investigational drug or device trial Patient does not have any other medical or surgical conditions, and prior abdominal surgery, that would preclude safe laparoscopy or implantation  Patient enrolled in PANTHER, s/p pancreatic patch placement, diagnostic laparoscopy and Mediport placement on 6/26.  Research Protocol CT Abdomen Pelvis 7/9 with 3.4 x 2.5 cm panc body mass, no mets. Chemo patch not visualized.    C1 FFX on 7/16/2025  Plan: Follow up after 2 months scans Continue FOLFIRINOX

## 2025-07-22 NOTE — HISTORY OF PRESENT ILLNESS
[de-identified] : 72yo F who underwent abdominal ultrasound for c/o abd pain, nausea and 18lb unintentional weight loss x 2-3 months.   Hemoglobin was slightly elevated 6.2.   Ultrasound on 5/19/2025 showed no cholelithiasis but did note a 2.8 cm pancreatic neck/body mass.   MRCP 5/27/2025 showed a 4.1 x 2.3 x 0.8 cm pancreatic body lesion.    EUS 6/9/2025 w Dr. Ambrosio confirmed a 2.7 x 2.6 cm pancreatic body mass which was biopsied. PD measured 2 mm. No significant abnormality seen in HOP, genu of pancreas, panc tail or main PD, gallbladder, CBD, or left hepatic lobe.   Pathology with adenocarcinoma.   Ca19-9 < 2 & CEA 2.9 on 6/10/2025.   CT CAP Panc Protocol 6/12/2025 Pancreatic body hypoenhancing mass with distal duct dilatation and atrophy, suspicious for adenocarcinoma. No evidence of vascular involvement or metastatic disease.  Patient was presented at [facility] and consensus was for neoadj therapy.   6/17/2025 136lb ---> 118lb Continues with abdominal pain. Impacting sleep. Taking Excedrin and Tylenol q 4-6 hours. Helps momentarily then returns.  + nausea but no vomiting.  + fatigue. Mild anorexia & early satiety.   Interval History 7/2/2025: Pt s/p diagnostic lap with pancreatic patch placement on 6/26. Patient reports feeling bloating, intermittent abd pain. Stool fluctuates between pale and brown. Some straining but not hard. Hydating well, > 1 L H20 daily, some coffee, tea.  + Anorexia. Eating 2 small meals a day plus some snacks.   Interval History 7/22/2025:  Here for symptom check.  S/p C1 FFX on 7/16.   Research Protocol CT Abdomen Pelvis 7/9 with 3.4 x 2.5 cm panc body mass, no mets. Chemo patch not visualized.    7/2/2025 ECOG 0 7/22/2025 ECOG 0   PMHx: HDL, Asthma, GERD  PSHx: None  FHx: Mother w breast ca (age 41yo), no genetic testing.   Allergies: Amoxicillin   Meds: Omeprazole AM, Pantoprazole HS, Montelukast, Crestor   Social: Lives w . Non-smoker, enjoys alcohol, but has stopped since diagnosis. 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

## 2025-07-22 NOTE — ASSESSMENT
[FreeTextEntry1] : 72yo F with 3.1 cm pancreatic body mass.  Abuts confluence portal vein/ splenic.  Borderline resectable.  Discussed preference for upfront systemic therapy.   First identified on an abd u/s 5/2025 which was obtained for GI complaints. MRCP 5/27/2025 showed a 4.1 x 2.3 x 0.8 cm pancreatic body lesion. EUS 6/9/2025 w Dr. Ambrosio confirmed a 2.7 x 2.6cm pancreatic body mass which was biopsied. PD measured 2 mm. No significant abnormality seen in HOP, genu of pancreas, panc tail or main PD, gallbladder, CBD, or left hepatic lobe.  Pathology w adenocarcinoma.  CA 19-9 non-secretor.   CT CAP Panc Protocol 6/12/2025 Pancreatic body hypoenhancing mass with distal duct dilatation and atrophy, suspicious for adenocarcinoma.   PANTHER trial eligibility: Patient's tumor size and anatomy is amenable to PTM-101 placement based on imaging Patient has no signs or symptoms of pancreatitis Patient does not have other active medical issues which would confound interpretation of safety monitoring, efficacy results or prevent the subject from study participation Patient is not of child-bearing potential Patient does not have active non-pancreatic cancer that currently requires treatment or is being treated : non-melanoma skin cancer in 2024 is not exclusionary Patient does not have any contraindications or allergies to paclitaxel, PLGA, or chemotherapies in protocol (e.g., FOLFIRINOX, gemcitabine, nab-paclitaxel) Patient does not have known HIV or active viral hepatitis Patient does not have active ongoing infection or autoimmune disease which may preclude laparoscopy, placement of the PTM-101 implant, administration of chemotherapy or surgical resection of pancreatic tumor Patient does not have the inability to comply with activities and therapeutic interventions as outlined in the schedule of events Patient is not currently enrolled in another investigational drug or device trial Patient does not have any other medical or surgical conditions, and prior abdominal surgery, that would preclude safe laparoscopy or implantation  Patient enrolled in PANTHER, s/p pancreatic patch placement, diagnostic laparoscopy and Mediport placement on 6/26.  Research Protocol CT Abdomen Pelvis 7/9 with 3.4 x 2.5 cm panc body mass, no mets. Chemo patch not visualized.    C1 FFX on 7/16/2025  Plan: Follow up after 2 months scans Continue FOLFIRINOX

## 2025-07-22 NOTE — HISTORY OF PRESENT ILLNESS
[de-identified] : 74yo F who underwent abdominal ultrasound for c/o abd pain, nausea and 18lb unintentional weight loss x 2-3 months.   Hemoglobin was slightly elevated 6.2.   Ultrasound on 5/19/2025 showed no cholelithiasis but did note a 2.8 cm pancreatic neck/body mass.   MRCP 5/27/2025 showed a 4.1 x 2.3 x 0.8 cm pancreatic body lesion.    EUS 6/9/2025 w Dr. Ambrosio confirmed a 2.7 x 2.6 cm pancreatic body mass which was biopsied. PD measured 2 mm. No significant abnormality seen in HOP, genu of pancreas, panc tail or main PD, gallbladder, CBD, or left hepatic lobe.   Pathology with adenocarcinoma.   Ca19-9 < 2 & CEA 2.9 on 6/10/2025.   CT CAP Panc Protocol 6/12/2025 Pancreatic body hypoenhancing mass with distal duct dilatation and atrophy, suspicious for adenocarcinoma. No evidence of vascular involvement or metastatic disease.  Patient was presented at [facility] and consensus was for neoadj therapy.   6/17/2025 136lb ---> 118lb Continues with abdominal pain. Impacting sleep. Taking Excedrin and Tylenol q 4-6 hours. Helps momentarily then returns.  + nausea but no vomiting.  + fatigue. Mild anorexia & early satiety.   Interval History 7/2/2025: Pt s/p diagnostic lap with pancreatic patch placement on 6/26. Patient reports feeling bloating, intermittent abd pain. Stool fluctuates between pale and brown. Some straining but not hard. Hydating well, > 1 L H20 daily, some coffee, tea.  + Anorexia. Eating 2 small meals a day plus some snacks.   Interval History 7/22/2025:  Here for symptom check.  S/p C1 FFX on 7/16.   Research Protocol CT Abdomen Pelvis 7/9 with 3.4 x 2.5 cm panc body mass, no mets. Chemo patch not visualized.    7/2/2025 ECOG 0 7/22/2025 ECOG 0   PMHx: HDL, Asthma, GERD  PSHx: None  FHx: Mother w breast ca (age 43yo), no genetic testing.   Allergies: Amoxicillin   Meds: Omeprazole AM, Pantoprazole HS, Montelukast, Crestor   Social: Lives w . Non-smoker, enjoys alcohol, but has stopped since diagnosis. 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

## 2025-07-30 NOTE — HISTORY OF PRESENT ILLNESS
[Disease: _____________________] : Disease: [unfilled] [de-identified] : 73 years old female 05/19/2025, US of Abdomen: No evidence of cholelithiasis. Apparent pancreatic mass. Further evaluation with CT or MRI/MRCP is recommended.  05/27/2025, MRI, MRCP: 4.1 x 2.3 x 0.8 cm lesion in the body of the pancreas which correlates with the lesion seen on the ultrasound of the abdomen on 5/19/2025. Differentials include pancreatic carcinoma such as adenocarcinoma or acinar cell carcinoma. Recommend EUS/biopsy for further evaluation.  06/09/2025, Surgical Pathology Report: 1.  Gastric, random biops-   Mild Chronic inactive gastritis and mild reactive gastropathy change. Negative for intestinal metaplasia. Negative for morphologic evidence of H. pylori. 2.  Pancreatic body, mass, biopsy -   Adenocarcinoma.  06/12/2025, CT-Scan of Abdomen/Pelvis/Chest: Pancreatic body hypoenhancing mass with distal duct dilatation and atrophy, suspicious for adenocarcinoma. No evidence of vascular involvement or metastatic disease.  [de-identified] : 6/9/2025- ethel [de-identified] : 7/30/25 C2 mFFX. 5-FU 2000 mg/m2; Oxaliplatin 85 mg/m2; Irinotecan 100 mg/m2 OnPro on D#3 Diarrhea abated. Denies neuropathy. Blood counts reviewed. Cranial prosthesis Rx given.

## 2025-07-30 NOTE — ASSESSMENT
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  Patient today again FOLFIRINOX chemotherapy.  She will be monitored for side effects toxicity.  The patient will receive treatment every 2 weeks for 4 cycles and then repeat scan to assess for response or progression of disease.  She does note some mild right-sided upper abdominal discomfort intermittently.  This responds to Tylenol.  She did try Creon treatment however it made her uncomfortable and she stopped the treatment.  She will return to the office in 2 weeks or sooner as needed as per research.